# Patient Record
Sex: MALE | Race: ASIAN | NOT HISPANIC OR LATINO | URBAN - METROPOLITAN AREA
[De-identification: names, ages, dates, MRNs, and addresses within clinical notes are randomized per-mention and may not be internally consistent; named-entity substitution may affect disease eponyms.]

---

## 2018-11-01 ENCOUNTER — EMERGENCY (EMERGENCY)
Facility: HOSPITAL | Age: 43
LOS: 1 days | Discharge: LEFT BEFORE DISCHARGE | End: 2018-11-01
Attending: EMERGENCY MEDICINE | Admitting: EMERGENCY MEDICINE
Payer: SELF-PAY

## 2018-11-01 VITALS
OXYGEN SATURATION: 99 % | DIASTOLIC BLOOD PRESSURE: 99 MMHG | SYSTOLIC BLOOD PRESSURE: 127 MMHG | TEMPERATURE: 98 F | RESPIRATION RATE: 17 BRPM | HEART RATE: 109 BPM

## 2018-11-01 VITALS
SYSTOLIC BLOOD PRESSURE: 100 MMHG | HEART RATE: 86 BPM | RESPIRATION RATE: 16 BRPM | DIASTOLIC BLOOD PRESSURE: 61 MMHG | OXYGEN SATURATION: 99 %

## 2018-11-01 LAB
ALBUMIN SERPL ELPH-MCNC: 4 G/DL — SIGNIFICANT CHANGE UP (ref 3.3–5)
ALP SERPL-CCNC: 73 U/L — SIGNIFICANT CHANGE UP (ref 40–120)
ALT FLD-CCNC: 66 U/L — HIGH (ref 4–41)
AST SERPL-CCNC: 48 U/L — HIGH (ref 4–40)
BASOPHILS # BLD AUTO: 0.07 K/UL — SIGNIFICANT CHANGE UP (ref 0–0.2)
BASOPHILS NFR BLD AUTO: 0.9 % — SIGNIFICANT CHANGE UP (ref 0–2)
BILIRUB SERPL-MCNC: 1 MG/DL — SIGNIFICANT CHANGE UP (ref 0.2–1.2)
BUN SERPL-MCNC: 19 MG/DL — SIGNIFICANT CHANGE UP (ref 7–23)
CALCIUM SERPL-MCNC: 9.4 MG/DL — SIGNIFICANT CHANGE UP (ref 8.4–10.5)
CHLORIDE SERPL-SCNC: 103 MMOL/L — SIGNIFICANT CHANGE UP (ref 98–107)
CO2 SERPL-SCNC: 26 MMOL/L — SIGNIFICANT CHANGE UP (ref 22–31)
CREAT SERPL-MCNC: 1.07 MG/DL — SIGNIFICANT CHANGE UP (ref 0.5–1.3)
EOSINOPHIL # BLD AUTO: 0.07 K/UL — SIGNIFICANT CHANGE UP (ref 0–0.5)
EOSINOPHIL NFR BLD AUTO: 0.9 % — SIGNIFICANT CHANGE UP (ref 0–6)
GLUCOSE SERPL-MCNC: 76 MG/DL — SIGNIFICANT CHANGE UP (ref 70–99)
HCT VFR BLD CALC: 45.6 % — SIGNIFICANT CHANGE UP (ref 39–50)
HGB BLD-MCNC: 15.2 G/DL — SIGNIFICANT CHANGE UP (ref 13–17)
IMM GRANULOCYTES # BLD AUTO: 0.01 # — SIGNIFICANT CHANGE UP
IMM GRANULOCYTES NFR BLD AUTO: 0.1 % — SIGNIFICANT CHANGE UP (ref 0–1.5)
LYMPHOCYTES # BLD AUTO: 2.68 K/UL — SIGNIFICANT CHANGE UP (ref 1–3.3)
LYMPHOCYTES # BLD AUTO: 34.4 % — SIGNIFICANT CHANGE UP (ref 13–44)
MAGNESIUM SERPL-MCNC: 2.3 MG/DL — SIGNIFICANT CHANGE UP (ref 1.6–2.6)
MCHC RBC-ENTMCNC: 27.9 PG — SIGNIFICANT CHANGE UP (ref 27–34)
MCHC RBC-ENTMCNC: 33.3 % — SIGNIFICANT CHANGE UP (ref 32–36)
MCV RBC AUTO: 83.8 FL — SIGNIFICANT CHANGE UP (ref 80–100)
MONOCYTES # BLD AUTO: 0.94 K/UL — HIGH (ref 0–0.9)
MONOCYTES NFR BLD AUTO: 12.1 % — SIGNIFICANT CHANGE UP (ref 2–14)
NEUTROPHILS # BLD AUTO: 4.01 K/UL — SIGNIFICANT CHANGE UP (ref 1.8–7.4)
NEUTROPHILS NFR BLD AUTO: 51.6 % — SIGNIFICANT CHANGE UP (ref 43–77)
NRBC # FLD: 0 — SIGNIFICANT CHANGE UP
PHOSPHATE SERPL-MCNC: 3.2 MG/DL — SIGNIFICANT CHANGE UP (ref 2.5–4.5)
PLATELET # BLD AUTO: 299 K/UL — SIGNIFICANT CHANGE UP (ref 150–400)
PMV BLD: 10.6 FL — SIGNIFICANT CHANGE UP (ref 7–13)
POTASSIUM SERPL-MCNC: 4.9 MMOL/L — SIGNIFICANT CHANGE UP (ref 3.5–5.3)
POTASSIUM SERPL-SCNC: 4.9 MMOL/L — SIGNIFICANT CHANGE UP (ref 3.5–5.3)
PROT SERPL-MCNC: 7.7 G/DL — SIGNIFICANT CHANGE UP (ref 6–8.3)
RBC # BLD: 5.44 M/UL — SIGNIFICANT CHANGE UP (ref 4.2–5.8)
RBC # FLD: 13.3 % — SIGNIFICANT CHANGE UP (ref 10.3–14.5)
SODIUM SERPL-SCNC: 142 MMOL/L — SIGNIFICANT CHANGE UP (ref 135–145)
TROPONIN T, HIGH SENSITIVITY: 30 NG/L — SIGNIFICANT CHANGE UP (ref ?–14)
TROPONIN T, HIGH SENSITIVITY: 38 NG/L — SIGNIFICANT CHANGE UP (ref ?–14)
TSH SERPL-MCNC: 1.41 UIU/ML — SIGNIFICANT CHANGE UP (ref 0.27–4.2)
WBC # BLD: 7.78 K/UL — SIGNIFICANT CHANGE UP (ref 3.8–10.5)
WBC # FLD AUTO: 7.78 K/UL — SIGNIFICANT CHANGE UP (ref 3.8–10.5)

## 2018-11-01 PROCEDURE — 71046 X-RAY EXAM CHEST 2 VIEWS: CPT | Mod: 26

## 2018-11-01 PROCEDURE — 99285 EMERGENCY DEPT VISIT HI MDM: CPT | Mod: 25

## 2018-11-01 PROCEDURE — 93010 ELECTROCARDIOGRAM REPORT: CPT

## 2018-11-01 RX ORDER — METOCLOPRAMIDE HCL 10 MG
10 TABLET ORAL ONCE
Qty: 0 | Refills: 0 | Status: COMPLETED | OUTPATIENT
Start: 2018-11-01 | End: 2018-11-01

## 2018-11-01 RX ORDER — ACETAMINOPHEN 500 MG
975 TABLET ORAL ONCE
Qty: 0 | Refills: 0 | Status: COMPLETED | OUTPATIENT
Start: 2018-11-01 | End: 2018-11-01

## 2018-11-01 RX ORDER — SODIUM CHLORIDE 9 MG/ML
1000 INJECTION, SOLUTION INTRAVENOUS ONCE
Qty: 0 | Refills: 0 | Status: COMPLETED | OUTPATIENT
Start: 2018-11-01 | End: 2018-11-01

## 2018-11-01 RX ADMIN — SODIUM CHLORIDE 2000 MILLILITER(S): 9 INJECTION, SOLUTION INTRAVENOUS at 18:05

## 2018-11-01 RX ADMIN — Medication 975 MILLIGRAM(S): at 19:28

## 2018-11-01 RX ADMIN — Medication 10 MILLIGRAM(S): at 19:28

## 2018-11-01 NOTE — ED ADULT TRIAGE NOTE - CHIEF COMPLAINT QUOTE
pt states while driving he experienced an episode of palpations followed by feeling lightheaded. pt ambulatory to triage with steady gait. denies visual changes.

## 2018-11-01 NOTE — ED ADULT NURSE NOTE - NSIMPLEMENTINTERV_GEN_ALL_ED
Implemented All Universal Safety Interventions:  [place] to call system. Call bell, personal items and telephone within reach. Instruct patient to call for assistance. Room bathroom lighting operational. Non-slip footwear when patient is off stretcher. Physically safe environment: no spills, clutter or unnecessary equipment. Stretcher in lowest position, wheels locked, appropriate side rails in place.

## 2018-11-01 NOTE — ED ADULT NURSE NOTE - OBJECTIVE STATEMENT
Pt. A&Ox3, c/o left-sided cp and dizziness starting this morning which has since subsided. Now c/o headache only. At the onset of cp, pt. became diaphoretic. Denies any radiation of pain or tingling/numbness to arm. Denies any dizziness, sob, cp, n/v at this time. VS done as charted, breathing well on RA. Placed on cardiac monitor. #20g IV placed in left AC, labs sent. Awaiting results. Will continue to monitor.

## 2018-11-01 NOTE — ED PROVIDER NOTE - ATTENDING CONTRIBUTION TO CARE
AJM: Patient seen with resident and agree with above note. 43yom w/ no PMH had a sudden onset of palpitations and chest discomfort while driving, associated w/ sweating, lightheadedness and mild headache. Symptoms lasted approx 2 hours and have now improved w/o intervention but still endorses mild lightheadedness. No chest pain, back pain, abdominal pain, fevers chills, cough. Clean cath approx 6 years ago, no recent cardiac eval. currently asymptomatic. normal exam. no neuro symptoms. will obtain acs workup.

## 2018-11-01 NOTE — ED PROVIDER NOTE - PROGRESS NOTE DETAILS
AJM: + delta trop. pt needs cards admission. pt refusing admission as he has a court date he cannot miss in the morning. explained the risk of leaving including MI or death. Patient has requested to leave against medical advice. Extensive time was spent with patient counseling on the imporatance of staying for full evaluation and the dangers of leaving early. Patient understands that by leaving prior to completion of evaluation that this could lead to missed diagnoses resulting in significant morbity and disability as well as death. Patient has verbalized this understanding and agress to return to ER if symptoms worsen and have instructed patient to follow up with PMD promptly.

## 2018-11-01 NOTE — ED PROVIDER NOTE - MEDICAL DECISION MAKING DETAILS
43yom w/ palpitations, lightheadedness, concerning for possible ACS or dysrhythmia. ECG w/ mild tachycardia, no ischemic changes, no evidence of pericarditis. Will check cbc, cmp, serial troponins. Dispo pending labs.

## 2018-11-01 NOTE — ED PROVIDER NOTE - OBJECTIVE STATEMENT
43yom w/ no PMH had a sudden onset of palpitations and chest discomfort while driving, associated w/ sweating, lightheadedness and mild headache. Symptoms lasted approx 2 hours and have now improved w/o intervention but still endorses mild lightheadedness. No chest pain, back pain, abdominal pain, fevers chills, cough. Clean cath approx 6 years ago, no recent cardiac eval.

## 2021-03-01 NOTE — ED ADULT NURSE NOTE - DRUG PRE-SCREENING (DAST -1)
-- DO NOT REPLY / DO NOT REPLY ALL --  -- Message is from the [facility]--    COVID-19 Universal Screening: N/A - Not about scheduling    General Patient Message      Reason for Call: Franklyn wants the pt to switch to a different med b/c its cheaper but the wife and pt wants him to stay on the medication you prescribed him ( donepezil 10 mg) bc it works good for him.     Caller Information       Type Contact Phone    03/01/2021 11:06 AM CST Phone (Incoming) STEVE CLARK (Emergency Contact) [phone]          Alternative phone number: n/a    Turnaround time given to caller:   \"This message will be sent to [state Provider's name]. The clinical team will fulfill your request as soon as they review your message.\"     Statement Selected

## 2021-06-01 ENCOUNTER — EMERGENCY (EMERGENCY)
Facility: HOSPITAL | Age: 46
LOS: 1 days | Discharge: HOME | End: 2021-06-01
Attending: STUDENT IN AN ORGANIZED HEALTH CARE EDUCATION/TRAINING PROGRAM | Admitting: STUDENT IN AN ORGANIZED HEALTH CARE EDUCATION/TRAINING PROGRAM
Payer: COMMERCIAL

## 2021-06-01 VITALS
TEMPERATURE: 98 F | SYSTOLIC BLOOD PRESSURE: 131 MMHG | HEART RATE: 75 BPM | DIASTOLIC BLOOD PRESSURE: 91 MMHG | RESPIRATION RATE: 18 BRPM | OXYGEN SATURATION: 100 %

## 2021-06-01 PROCEDURE — 99283 EMERGENCY DEPT VISIT LOW MDM: CPT

## 2021-06-01 RX ORDER — DIPHENHYDRAMINE HCL 50 MG
1 CAPSULE ORAL
Qty: 16 | Refills: 0
Start: 2021-06-01

## 2021-06-01 RX ORDER — LORATADINE 10 MG/1
10 TABLET ORAL ONCE
Refills: 0 | Status: COMPLETED | OUTPATIENT
Start: 2021-06-01 | End: 2021-06-01

## 2021-06-01 RX ORDER — KETOCONAZOLE 20 MG/G
1 AEROSOL, FOAM TOPICAL
Qty: 1 | Refills: 0
Start: 2021-06-01 | End: 2021-06-28

## 2021-06-01 RX ADMIN — LORATADINE 10 MILLIGRAM(S): 10 TABLET ORAL at 05:45

## 2021-06-01 NOTE — ED ADULT TRIAGE NOTE - CHIEF COMPLAINT QUOTE
allergic reaction    pt reports developed rash, itching, burning and facial swelling since Friday while in [place].  went to urgent care and started prednisone and Pepcid but feels rash is worse.  no sob, throat swelling.  pt denies food or drug allergies although listed on pt record.

## 2021-06-01 NOTE — ED PROVIDER NOTE - OBJECTIVE STATEMENT
45 yo male with unremarkable pmhx presenting with rash and itching. Patient had 1 week of watery eyes, began to have diffuse rash on chest, face, and facial swelling while in [place] yesterday, went to ED, given prednisone and pepcid. Rash persisting as well as facial swelling, came to ED.    Denies N/V, SOB, wheezing.

## 2021-06-01 NOTE — ED PROVIDER NOTE - ATTENDING CONTRIBUTION TO CARE
46M with no pmh presenting with itchy rash x 1 week around face, neck, chest, back. States symptoms started while in [place] with visit to urgent care and given steroids without significant relief. Denies any other symptoms. No fever, chills, cp, sob, nausea, vomiting, diarrhea, dysuria, headache, numbness, cough, weakness    GEN: NAD, awake, well appearing  HEENT: NCAT, MMM, normal conjunctiva, perrl, no appreciable facial swelling, no angioedema. No mucosal involvement  CHEST/LUNGS: Non-tachypneic, CTAB, bilateral breath sounds  CARDIAC: Non-tachycardic, s1s2, normal perfusion, no peripheral edema  ABDOMEN: Soft, NTND, No rebound/guarding  MSK: No joint tenderness, no gross deformity of extremities  SKIN: rash throughout face, neck, chest, back with small scattered raised bumps consistent with dermatitis, non tender, no significant overlying erythema  NEURO: CN grossly intact, normal coordination, no focal motor or sensory deficits  PSYCH: Alert, appropriate, cooperative     Patient presenting wit non specific dermatitis. possible allergic vs contact vs possible seborrheic dermatitis given distribution and appearance. Will recommend adding antihistamine and antifungal cream to regimen with close monitoring of symptoms and pcp followup. No infectious signs or symptoms noted.

## 2021-06-01 NOTE — ED PROVIDER NOTE - PATIENT PORTAL LINK FT
You can access the FollowMyHealth Patient Portal offered by [facility] by registering at the following website: [email or web address]. By joining [employer]’s FollowMyHealth portal, you will also be able to view your health information using other applications (apps) compatible with our system.

## 2021-06-01 NOTE — ED PROVIDER NOTE - NSFOLLOWUPINSTRUCTIONS_ED_ALL_ED_FT
Activities as tolerated. Please encourage good oral and fluid intake.    For rash, please continue Prednisone and Pepcid as directed.  Add Benadryl 25mg every 6 hours AS NEEDED.  Add Ketoconazole cream twice a day for 4 weeks.    Please see your primary care doctor within 24-48 hours for further management of your symptoms.    Please seek emergent medical management if you have any worsening signs or symptoms, such as shortness of breath, worsening swelling, loss of consciousness.

## 2021-06-01 NOTE — ED ADULT TRIAGE NOTE - PRO INTERPRETER NEED 2
Impression: Open angle with borderline findings, low risk, bilateral: H40.013. Plan: Discussed diagnosis, explained and understood by patient. No treatment recommended OU. VF and OCT shows Retinal thinning, associated with High Myopia. No evidence of any vision loss OU. Discussed Myopic changes vs Glaucoma diagnosis, low risk.
English

## 2021-06-01 NOTE — ED PROVIDER NOTE - CLINICAL SUMMARY MEDICAL DECISION MAKING FREE TEXT BOX
45 yo male with unremarkable pmhx presenting with rash and itching. suggestive of persisting allergy vs dermatitis. will trial antihistamine, will send additional meds to pharmacy and will reassess.

## 2023-08-11 NOTE — ED ADULT NURSE NOTE - EXTENSIONS OF SELF_ADULT
Airway    Date/Time: 8/10/2023 5:24 PM    Performed by: Raf Martínez M.D.  Authorized by: Raf Martínez M.D.    Location:  OR  Urgency:  Elective  Difficult Airway: No    Indications for Airway Management:  Anesthesia      Spontaneous Ventilation: absent    Sedation Level:  Deep  Preoxygenated: Yes    Mask Difficulty Assessment:  0 - not attempted  Final Airway Type:  Supraglottic airway  Final Supraglottic Airway:  Standard LMA    SGA Size:  4  Number of Attempts at Approach:  1        
None